# Patient Record
Sex: FEMALE | Race: OTHER | HISPANIC OR LATINO
[De-identification: names, ages, dates, MRNs, and addresses within clinical notes are randomized per-mention and may not be internally consistent; named-entity substitution may affect disease eponyms.]

---

## 2018-06-26 ENCOUNTER — APPOINTMENT (OUTPATIENT)
Dept: DERMATOLOGY | Facility: CLINIC | Age: 48
End: 2018-06-26

## 2018-07-31 ENCOUNTER — APPOINTMENT (OUTPATIENT)
Dept: DERMATOLOGY | Facility: CLINIC | Age: 48
End: 2018-07-31

## 2018-10-02 ENCOUNTER — RESULT REVIEW (OUTPATIENT)
Age: 48
End: 2018-10-02

## 2018-10-18 ENCOUNTER — APPOINTMENT (OUTPATIENT)
Dept: DERMATOLOGY | Facility: CLINIC | Age: 48
End: 2018-10-18

## 2018-11-21 ENCOUNTER — APPOINTMENT (OUTPATIENT)
Dept: DERMATOLOGY | Facility: CLINIC | Age: 48
End: 2018-11-21

## 2018-12-27 ENCOUNTER — APPOINTMENT (OUTPATIENT)
Dept: DERMATOLOGY | Facility: CLINIC | Age: 48
End: 2018-12-27
Payer: COMMERCIAL

## 2018-12-27 VITALS
WEIGHT: 160 LBS | HEIGHT: 65 IN | SYSTOLIC BLOOD PRESSURE: 100 MMHG | DIASTOLIC BLOOD PRESSURE: 74 MMHG | BODY MASS INDEX: 26.66 KG/M2

## 2018-12-27 DIAGNOSIS — L74.510 PRIMARY FOCAL HYPERHIDROSIS, AXILLA: ICD-10-CM

## 2018-12-27 DIAGNOSIS — Z91.89 OTHER SPECIFIED PERSONAL RISK FACTORS, NOT ELSEWHERE CLASSIFIED: ICD-10-CM

## 2018-12-27 DIAGNOSIS — Z87.39 PERSONAL HISTORY OF OTHER DISEASES OF THE MUSCULOSKELETAL SYSTEM AND CONNECTIVE TISSUE: ICD-10-CM

## 2018-12-27 DIAGNOSIS — L81.1 CHLOASMA: ICD-10-CM

## 2018-12-27 DIAGNOSIS — D23.9 OTHER BENIGN NEOPLASM OF SKIN, UNSPECIFIED: ICD-10-CM

## 2018-12-27 PROCEDURE — 99202 OFFICE O/P NEW SF 15 MIN: CPT

## 2018-12-27 RX ORDER — TRETINOIN 0.25 MG/G
0.03 CREAM TOPICAL
Qty: 1 | Refills: 3 | Status: ACTIVE | COMMUNITY
Start: 2018-12-27 | End: 1900-01-01

## 2018-12-27 RX ORDER — FLUOCINOLONE ACETONIDE, HYDROQUINONE, AND TRETINOIN .1; 40; .5 MG/G; MG/G; MG/G
0.01-4-0.05 CREAM TOPICAL
Qty: 1 | Refills: 3 | Status: COMPLETED | COMMUNITY
Start: 2018-12-27 | End: 2018-12-27

## 2018-12-27 RX ORDER — ALUMINUM CHLORIDE 20 %
20 SOLUTION, NON-ORAL TOPICAL
Qty: 1 | Refills: 5 | Status: ACTIVE | COMMUNITY
Start: 2018-12-27 | End: 1900-01-01

## 2019-09-23 ENCOUNTER — APPOINTMENT (OUTPATIENT)
Dept: SURGICAL ONCOLOGY | Facility: CLINIC | Age: 49
End: 2019-09-23
Payer: COMMERCIAL

## 2019-09-23 VITALS
BODY MASS INDEX: 26.03 KG/M2 | DIASTOLIC BLOOD PRESSURE: 72 MMHG | WEIGHT: 162 LBS | SYSTOLIC BLOOD PRESSURE: 120 MMHG | HEIGHT: 66 IN | HEART RATE: 70 BPM | RESPIRATION RATE: 18 BRPM

## 2019-09-23 DIAGNOSIS — M25.50 PAIN IN UNSPECIFIED JOINT: ICD-10-CM

## 2019-09-23 DIAGNOSIS — N64.59 OTHER SIGNS AND SYMPTOMS IN BREAST: ICD-10-CM

## 2019-09-23 DIAGNOSIS — Z86.59 PERSONAL HISTORY OF OTHER MENTAL AND BEHAVIORAL DISORDERS: ICD-10-CM

## 2019-09-23 PROCEDURE — 99203 OFFICE O/P NEW LOW 30 MIN: CPT

## 2019-09-23 NOTE — ASSESSMENT
[FreeTextEntry1] : IMP:\par Palpable left breast mass on patient's self breast exam.  Cyclic increase in size with patients menstrual cycle but no pain or tenderness.  No corresponding findings on breast imaging- breast imaging benign overall.  No discrete mass or worrisome findings on exam.\par \par PLAN:\par Return to office in 6 months for a repeat exam.  Continue annual breast imaging.

## 2019-09-23 NOTE — CONSULT LETTER
[Dear  ___] : Dear  [unfilled], [Consult Letter:] : I had the pleasure of evaluating your patient, [unfilled]. [Consult Closing:] : Thank you very much for allowing me to participate in the care of this patient.  If you have any questions, please do not hesitate to contact me. [Sincerely,] : Sincerely, [FreeTextEntry2] : Ephraim White MD

## 2019-09-23 NOTE — OB HISTORY
[Menarche Age ____] : menarche age [unfilled] [Definite ___ (Date)] : the last menstrual period was [unfilled] [Total Preg ___] : G[unfilled] [Live Births ___] : P[unfilled]  [FreeTextEntry2] : age 26 at first pregnancy

## 2019-09-23 NOTE — PHYSICAL EXAM
[Normal] : supple, no neck mass and thyroid not enlarged [Normal Supraclavicular Lymph Nodes] : normal supraclavicular lymph nodes [Normal Axillary Lymph Nodes] : normal axillary lymph nodes [Normal] : grossly intact [FreeTextEntry1] : AB present during exam  [de-identified] : Normal S1, S2.  Regular rate and rhythm.  [de-identified] : Clear breath sounds bilaterally, normal respiratory effort.  [de-identified] : Complete breast exam performed in supine and upright positions.  No palpable masses, tenderness, nipple discharge, inversion, deviation or enlarged axillary or supraclavicular lymph nodes bilaterally.  Left upper outer breast quadrant near axillary tail there is vague nodularity.

## 2019-09-23 NOTE — HISTORY OF PRESENT ILLNESS
[de-identified] : 48 year-old female presents for an initial consultation.  A few months ago she noticed a palpable mass in her left breast.  It has increased in size over time and enlarges cyclically with her menstrual cycle.  There is no associated pain, tenderness or nipple discharge.  She was referred for bilateral breast imaging in August 2019.  There are bilateral cysts but no evidence of malignancy and no mass or cyst in the region of palpable concern (BIRADS 2).  \par \par She has no prior issues related to her breasts.  She has a family history of breast cancer in a maternal aunt diagnosed at approximately 50.  She has a past medical history of SLE for which she takes Plaquenil and currently Prednisone 5 mg BID.

## 2020-03-24 ENCOUNTER — APPOINTMENT (OUTPATIENT)
Dept: SURGICAL ONCOLOGY | Facility: CLINIC | Age: 50
End: 2020-03-24

## 2020-08-05 ENCOUNTER — APPOINTMENT (OUTPATIENT)
Dept: SURGICAL ONCOLOGY | Facility: CLINIC | Age: 50
End: 2020-08-05

## 2020-10-06 ENCOUNTER — APPOINTMENT (OUTPATIENT)
Dept: SURGICAL ONCOLOGY | Facility: CLINIC | Age: 50
End: 2020-10-06
Payer: COMMERCIAL

## 2020-10-06 VITALS
HEART RATE: 80 BPM | DIASTOLIC BLOOD PRESSURE: 81 MMHG | OXYGEN SATURATION: 100 % | WEIGHT: 160 LBS | BODY MASS INDEX: 25.71 KG/M2 | TEMPERATURE: 98.1 F | HEIGHT: 66 IN | RESPIRATION RATE: 16 BRPM | SYSTOLIC BLOOD PRESSURE: 124 MMHG

## 2020-10-06 DIAGNOSIS — R92.2 INCONCLUSIVE MAMMOGRAM: ICD-10-CM

## 2020-10-06 PROCEDURE — 99213 OFFICE O/P EST LOW 20 MIN: CPT

## 2020-10-06 NOTE — PHYSICAL EXAM
[Normal] : supple, no neck mass and thyroid not enlarged [Normal Supraclavicular Lymph Nodes] : normal supraclavicular lymph nodes [Normal Axillary Lymph Nodes] : normal axillary lymph nodes [Normal] : oriented to person, place and time, with appropriate affect [FreeTextEntry1] : DT was present during the exam [de-identified] : Normal S1, S2.  Regular rate and rhythm.  [de-identified] : Complete breast exam performed in supine and upright positions.  No palpable masses, tenderness, nipple discharge, inversion, deviation or enlarged axillary or supraclavicular lymph nodes bilaterally.  Left upper outer breast quadrant near axillary tail there is vague nodularity.  [de-identified] : Clear breath sounds bilaterally, normal respiratory effort.

## 2020-10-06 NOTE — ADDENDUM
[FreeTextEntry1] : I, Gely Malone, acted soley as a scribe for Dr. Rodney Ramos on this date 10/06/2020.

## 2020-10-06 NOTE — HISTORY OF PRESENT ILLNESS
[de-identified] : 49 year-old female presents for a follow up breast exam. \par \par Pertinent Hx:\par  A few months ago she noticed a palpable mass in her left breast.  It has increased in size over time and enlarges cyclically with her menstrual cycle.  There is no associated pain, tenderness or nipple discharge.  She was referred for bilateral breast imaging in August 2019.  There are bilateral cysts but no evidence of malignancy and no mass or cyst in the region of palpable concern (BIRADS 2).  \par \par She has no prior issues related to her breasts.  She has a family history of breast cancer in a maternal aunt diagnosed at approximately 50.  She has a past medical history of SLE for which she takes Plaquenil and currently Prednisone 5 mg BID.  \par \par Today the pt was w/o any complaints. Denies palpable breast masses, nipple discharge, skin changes, inversion of breast pain. Denies constitutional symptoms\par

## 2021-02-24 ENCOUNTER — RESULT REVIEW (OUTPATIENT)
Age: 51
End: 2021-02-24

## 2021-05-25 ENCOUNTER — RESULT REVIEW (OUTPATIENT)
Age: 51
End: 2021-05-25

## 2023-12-20 ENCOUNTER — EMERGENCY (EMERGENCY)
Facility: HOSPITAL | Age: 53
LOS: 1 days | Discharge: HOME | End: 2023-12-20
Admitting: EMERGENCY MEDICINE
Payer: COMMERCIAL

## 2023-12-20 VITALS
DIASTOLIC BLOOD PRESSURE: 75 MMHG | RESPIRATION RATE: 16 BRPM | SYSTOLIC BLOOD PRESSURE: 126 MMHG | HEART RATE: 100 BPM | TEMPERATURE: 99 F | OXYGEN SATURATION: 100 %

## 2023-12-20 PROCEDURE — 99284 EMERGENCY DEPT VISIT MOD MDM: CPT

## 2023-12-20 NOTE — ED ADULT TRIAGE NOTE - CHIEF COMPLAINT QUOTE
Pt c/o bilateral lower back pain radiating to bilateral flanks, intermittent chill x 3 days. pt states she has urinary frequency but denies other urinary symptoms. No complaints of chest pain, headache, nausea, dizziness, vomiting  SOB, fever, verbalized..

## 2023-12-21 LAB
ALBUMIN SERPL ELPH-MCNC: 3.6 G/DL — SIGNIFICANT CHANGE UP (ref 3.3–5)
ALBUMIN SERPL ELPH-MCNC: 3.6 G/DL — SIGNIFICANT CHANGE UP (ref 3.3–5)
ALP SERPL-CCNC: 106 U/L — SIGNIFICANT CHANGE UP (ref 40–120)
ALP SERPL-CCNC: 106 U/L — SIGNIFICANT CHANGE UP (ref 40–120)
ALT FLD-CCNC: 29 U/L — SIGNIFICANT CHANGE UP (ref 4–33)
ALT FLD-CCNC: 29 U/L — SIGNIFICANT CHANGE UP (ref 4–33)
ANION GAP SERPL CALC-SCNC: 11 MMOL/L — SIGNIFICANT CHANGE UP (ref 7–14)
ANION GAP SERPL CALC-SCNC: 11 MMOL/L — SIGNIFICANT CHANGE UP (ref 7–14)
ANISOCYTOSIS BLD QL: SLIGHT — SIGNIFICANT CHANGE UP
ANISOCYTOSIS BLD QL: SLIGHT — SIGNIFICANT CHANGE UP
APPEARANCE UR: ABNORMAL
APPEARANCE UR: ABNORMAL
AST SERPL-CCNC: 27 U/L — SIGNIFICANT CHANGE UP (ref 4–32)
AST SERPL-CCNC: 27 U/L — SIGNIFICANT CHANGE UP (ref 4–32)
BACTERIA # UR AUTO: ABNORMAL /HPF
BACTERIA # UR AUTO: ABNORMAL /HPF
BASOPHILS # BLD AUTO: 0 K/UL — SIGNIFICANT CHANGE UP (ref 0–0.2)
BASOPHILS # BLD AUTO: 0 K/UL — SIGNIFICANT CHANGE UP (ref 0–0.2)
BASOPHILS NFR BLD AUTO: 0 % — SIGNIFICANT CHANGE UP (ref 0–2)
BASOPHILS NFR BLD AUTO: 0 % — SIGNIFICANT CHANGE UP (ref 0–2)
BILIRUB SERPL-MCNC: 0.4 MG/DL — SIGNIFICANT CHANGE UP (ref 0.2–1.2)
BILIRUB SERPL-MCNC: 0.4 MG/DL — SIGNIFICANT CHANGE UP (ref 0.2–1.2)
BILIRUB UR-MCNC: NEGATIVE — SIGNIFICANT CHANGE UP
BILIRUB UR-MCNC: NEGATIVE — SIGNIFICANT CHANGE UP
BUN SERPL-MCNC: 16 MG/DL — SIGNIFICANT CHANGE UP (ref 7–23)
BUN SERPL-MCNC: 16 MG/DL — SIGNIFICANT CHANGE UP (ref 7–23)
CALCIUM SERPL-MCNC: 8.7 MG/DL — SIGNIFICANT CHANGE UP (ref 8.4–10.5)
CALCIUM SERPL-MCNC: 8.7 MG/DL — SIGNIFICANT CHANGE UP (ref 8.4–10.5)
CAST: 2 /LPF — SIGNIFICANT CHANGE UP (ref 0–4)
CAST: 2 /LPF — SIGNIFICANT CHANGE UP (ref 0–4)
CHLORIDE SERPL-SCNC: 105 MMOL/L — SIGNIFICANT CHANGE UP (ref 98–107)
CHLORIDE SERPL-SCNC: 105 MMOL/L — SIGNIFICANT CHANGE UP (ref 98–107)
CO2 SERPL-SCNC: 21 MMOL/L — LOW (ref 22–31)
CO2 SERPL-SCNC: 21 MMOL/L — LOW (ref 22–31)
COLOR SPEC: YELLOW — SIGNIFICANT CHANGE UP
COLOR SPEC: YELLOW — SIGNIFICANT CHANGE UP
CREAT SERPL-MCNC: 0.86 MG/DL — SIGNIFICANT CHANGE UP (ref 0.5–1.3)
CREAT SERPL-MCNC: 0.86 MG/DL — SIGNIFICANT CHANGE UP (ref 0.5–1.3)
DACRYOCYTES BLD QL SMEAR: SLIGHT — SIGNIFICANT CHANGE UP
DACRYOCYTES BLD QL SMEAR: SLIGHT — SIGNIFICANT CHANGE UP
DIFF PNL FLD: ABNORMAL
DIFF PNL FLD: ABNORMAL
EGFR: 82 ML/MIN/1.73M2 — SIGNIFICANT CHANGE UP
EGFR: 82 ML/MIN/1.73M2 — SIGNIFICANT CHANGE UP
EOSINOPHIL # BLD AUTO: 0.03 K/UL — SIGNIFICANT CHANGE UP (ref 0–0.5)
EOSINOPHIL # BLD AUTO: 0.03 K/UL — SIGNIFICANT CHANGE UP (ref 0–0.5)
EOSINOPHIL NFR BLD AUTO: 0.9 % — SIGNIFICANT CHANGE UP (ref 0–6)
EOSINOPHIL NFR BLD AUTO: 0.9 % — SIGNIFICANT CHANGE UP (ref 0–6)
FLUAV AG NPH QL: SIGNIFICANT CHANGE UP
FLUAV AG NPH QL: SIGNIFICANT CHANGE UP
FLUBV AG NPH QL: SIGNIFICANT CHANGE UP
FLUBV AG NPH QL: SIGNIFICANT CHANGE UP
GLUCOSE SERPL-MCNC: 124 MG/DL — HIGH (ref 70–99)
GLUCOSE SERPL-MCNC: 124 MG/DL — HIGH (ref 70–99)
GLUCOSE UR QL: NEGATIVE MG/DL — SIGNIFICANT CHANGE UP
GLUCOSE UR QL: NEGATIVE MG/DL — SIGNIFICANT CHANGE UP
HCT VFR BLD CALC: 32.8 % — LOW (ref 34.5–45)
HCT VFR BLD CALC: 32.8 % — LOW (ref 34.5–45)
HGB BLD-MCNC: 11 G/DL — LOW (ref 11.5–15.5)
HGB BLD-MCNC: 11 G/DL — LOW (ref 11.5–15.5)
IANC: 2.34 K/UL — SIGNIFICANT CHANGE UP (ref 1.8–7.4)
IANC: 2.34 K/UL — SIGNIFICANT CHANGE UP (ref 1.8–7.4)
KETONES UR-MCNC: NEGATIVE MG/DL — SIGNIFICANT CHANGE UP
KETONES UR-MCNC: NEGATIVE MG/DL — SIGNIFICANT CHANGE UP
LEUKOCYTE ESTERASE UR-ACNC: ABNORMAL
LEUKOCYTE ESTERASE UR-ACNC: ABNORMAL
LYMPHOCYTES # BLD AUTO: 0.11 K/UL — LOW (ref 1–3.3)
LYMPHOCYTES # BLD AUTO: 0.11 K/UL — LOW (ref 1–3.3)
LYMPHOCYTES # BLD AUTO: 3.6 % — LOW (ref 13–44)
LYMPHOCYTES # BLD AUTO: 3.6 % — LOW (ref 13–44)
MACROCYTES BLD QL: SIGNIFICANT CHANGE UP
MACROCYTES BLD QL: SIGNIFICANT CHANGE UP
MANUAL SMEAR VERIFICATION: SIGNIFICANT CHANGE UP
MANUAL SMEAR VERIFICATION: SIGNIFICANT CHANGE UP
MCHC RBC-ENTMCNC: 33.5 GM/DL — SIGNIFICANT CHANGE UP (ref 32–36)
MCHC RBC-ENTMCNC: 33.5 GM/DL — SIGNIFICANT CHANGE UP (ref 32–36)
MCHC RBC-ENTMCNC: 36.5 PG — HIGH (ref 27–34)
MCHC RBC-ENTMCNC: 36.5 PG — HIGH (ref 27–34)
MCV RBC AUTO: 109 FL — HIGH (ref 80–100)
MCV RBC AUTO: 109 FL — HIGH (ref 80–100)
MONOCYTES # BLD AUTO: 0.17 K/UL — SIGNIFICANT CHANGE UP (ref 0–0.9)
MONOCYTES # BLD AUTO: 0.17 K/UL — SIGNIFICANT CHANGE UP (ref 0–0.9)
MONOCYTES NFR BLD AUTO: 5.5 % — SIGNIFICANT CHANGE UP (ref 2–14)
MONOCYTES NFR BLD AUTO: 5.5 % — SIGNIFICANT CHANGE UP (ref 2–14)
NEUTROPHILS # BLD AUTO: 2.81 K/UL — SIGNIFICANT CHANGE UP (ref 1.8–7.4)
NEUTROPHILS # BLD AUTO: 2.81 K/UL — SIGNIFICANT CHANGE UP (ref 1.8–7.4)
NEUTROPHILS NFR BLD AUTO: 87.3 % — HIGH (ref 43–77)
NEUTROPHILS NFR BLD AUTO: 87.3 % — HIGH (ref 43–77)
NEUTS BAND # BLD: 1.8 % — SIGNIFICANT CHANGE UP (ref 0–6)
NEUTS BAND # BLD: 1.8 % — SIGNIFICANT CHANGE UP (ref 0–6)
NITRITE UR-MCNC: POSITIVE
NITRITE UR-MCNC: POSITIVE
OVALOCYTES BLD QL SMEAR: SLIGHT — SIGNIFICANT CHANGE UP
OVALOCYTES BLD QL SMEAR: SLIGHT — SIGNIFICANT CHANGE UP
PH UR: 6 — SIGNIFICANT CHANGE UP (ref 5–8)
PH UR: 6 — SIGNIFICANT CHANGE UP (ref 5–8)
PLAT MORPH BLD: NORMAL — SIGNIFICANT CHANGE UP
PLAT MORPH BLD: NORMAL — SIGNIFICANT CHANGE UP
PLATELET # BLD AUTO: 284 K/UL — SIGNIFICANT CHANGE UP (ref 150–400)
PLATELET # BLD AUTO: 284 K/UL — SIGNIFICANT CHANGE UP (ref 150–400)
PLATELET COUNT - ESTIMATE: NORMAL — SIGNIFICANT CHANGE UP
PLATELET COUNT - ESTIMATE: NORMAL — SIGNIFICANT CHANGE UP
POIKILOCYTOSIS BLD QL AUTO: SLIGHT — SIGNIFICANT CHANGE UP
POIKILOCYTOSIS BLD QL AUTO: SLIGHT — SIGNIFICANT CHANGE UP
POLYCHROMASIA BLD QL SMEAR: SLIGHT — SIGNIFICANT CHANGE UP
POLYCHROMASIA BLD QL SMEAR: SLIGHT — SIGNIFICANT CHANGE UP
POTASSIUM SERPL-MCNC: 3.9 MMOL/L — SIGNIFICANT CHANGE UP (ref 3.5–5.3)
POTASSIUM SERPL-MCNC: 3.9 MMOL/L — SIGNIFICANT CHANGE UP (ref 3.5–5.3)
POTASSIUM SERPL-SCNC: 3.9 MMOL/L — SIGNIFICANT CHANGE UP (ref 3.5–5.3)
POTASSIUM SERPL-SCNC: 3.9 MMOL/L — SIGNIFICANT CHANGE UP (ref 3.5–5.3)
PROT SERPL-MCNC: 6.2 G/DL — SIGNIFICANT CHANGE UP (ref 6–8.3)
PROT SERPL-MCNC: 6.2 G/DL — SIGNIFICANT CHANGE UP (ref 6–8.3)
PROT UR-MCNC: SIGNIFICANT CHANGE UP MG/DL
PROT UR-MCNC: SIGNIFICANT CHANGE UP MG/DL
RBC # BLD: 3.01 M/UL — LOW (ref 3.8–5.2)
RBC # BLD: 3.01 M/UL — LOW (ref 3.8–5.2)
RBC # FLD: 14.5 % — SIGNIFICANT CHANGE UP (ref 10.3–14.5)
RBC # FLD: 14.5 % — SIGNIFICANT CHANGE UP (ref 10.3–14.5)
RBC BLD AUTO: ABNORMAL
RBC BLD AUTO: ABNORMAL
RBC CASTS # UR COMP ASSIST: 1 /HPF — SIGNIFICANT CHANGE UP (ref 0–4)
RBC CASTS # UR COMP ASSIST: 1 /HPF — SIGNIFICANT CHANGE UP (ref 0–4)
RSV RNA NPH QL NAA+NON-PROBE: SIGNIFICANT CHANGE UP
RSV RNA NPH QL NAA+NON-PROBE: SIGNIFICANT CHANGE UP
SARS-COV-2 RNA SPEC QL NAA+PROBE: SIGNIFICANT CHANGE UP
SARS-COV-2 RNA SPEC QL NAA+PROBE: SIGNIFICANT CHANGE UP
SODIUM SERPL-SCNC: 137 MMOL/L — SIGNIFICANT CHANGE UP (ref 135–145)
SODIUM SERPL-SCNC: 137 MMOL/L — SIGNIFICANT CHANGE UP (ref 135–145)
SP GR SPEC: 1.02 — SIGNIFICANT CHANGE UP (ref 1–1.03)
SP GR SPEC: 1.02 — SIGNIFICANT CHANGE UP (ref 1–1.03)
SQUAMOUS # UR AUTO: 2 /HPF — SIGNIFICANT CHANGE UP (ref 0–5)
SQUAMOUS # UR AUTO: 2 /HPF — SIGNIFICANT CHANGE UP (ref 0–5)
UROBILINOGEN FLD QL: 1 MG/DL — SIGNIFICANT CHANGE UP (ref 0.2–1)
UROBILINOGEN FLD QL: 1 MG/DL — SIGNIFICANT CHANGE UP (ref 0.2–1)
VARIANT LYMPHS # BLD: 0.9 % — SIGNIFICANT CHANGE UP (ref 0–6)
VARIANT LYMPHS # BLD: 0.9 % — SIGNIFICANT CHANGE UP (ref 0–6)
WBC # BLD: 3.15 K/UL — LOW (ref 3.8–10.5)
WBC # BLD: 3.15 K/UL — LOW (ref 3.8–10.5)
WBC # FLD AUTO: 3.15 K/UL — LOW (ref 3.8–10.5)
WBC # FLD AUTO: 3.15 K/UL — LOW (ref 3.8–10.5)
WBC UR QL: 136 /HPF — HIGH (ref 0–5)
WBC UR QL: 136 /HPF — HIGH (ref 0–5)

## 2023-12-21 PROCEDURE — 76770 US EXAM ABDO BACK WALL COMP: CPT | Mod: 26

## 2023-12-21 RX ADMIN — Medication 1 TABLET(S): at 02:04

## 2023-12-21 NOTE — ED PROVIDER NOTE - MUSCULOSKELETAL, MLM
Tenderness to the left flank Spine appears normal, range of motion is not limited, no muscle or joint tenderness

## 2023-12-21 NOTE — ED PROVIDER NOTE - CLINICAL SUMMARY MEDICAL DECISION MAKING FREE TEXT BOX
Patient is a 52-year-old female, past medical history of lupus presenting with a complaint of bilateral flank pain radiating to the back associated with urinary frequency, chills x times few days. On presentation patient nontoxic-appearing, vital stable symptoms possibly due to UTI/pyelonephritis/viral syndrome/MSK pain.  Plan for routine labs, UA/urine culture, viral swab, ultrasound of kidney/bladder.

## 2023-12-21 NOTE — ED ADULT NURSE NOTE - OBJECTIVE STATEMENT
Received pt in intake. Pt is A&O x4 past medical history of lupus. Pt came to the ED due to B/L flank pain. Pt states she also has increased urinary frequency with chills for the past few days. Pt breathing is equal and nonlabored. Pt abdomen is soft and nondistended. Pt denies any chest pain, SOB, headache, or any sick contacts. Pt butterflied as MD ordered and labs collected and sent. Pt safety maintained.

## 2023-12-21 NOTE — ED PROVIDER NOTE - BIRTH SEX
Physician Pre-Sedation Assessment    Pre-Sedation Assessment:    Sedation History: Previous Sedation with No Complications and Airway Assessed    Cardiac: normal S1, S2  Respiratory: breath sounds clear bilaterally   Abdomen: soft, BS (+), non-tender    AS
Female

## 2023-12-21 NOTE — ED PROVIDER NOTE - OBJECTIVE STATEMENT
Patient is a 52-year-old female, past medical history of lupus presenting with a complaint of bilateral flank pain radiating to the back associated with urinary frequency, chills x times few days.  No associated nasal congestion, cough or other URI-like symptoms, dysuria, hematuria.  Patient additionally denies remote history of back pain, recent trauma, strenuous activity, recent travel, sick contacts prior to the onset of symptoms.

## 2023-12-21 NOTE — ED ADULT NURSE NOTE - NSFALLUNIVINTERV_ED_ALL_ED
Bed/Stretcher in lowest position, wheels locked, appropriate side rails in place/Call bell, personal items and telephone in reach/Instruct patient to call for assistance before getting out of bed/chair/stretcher/Non-slip footwear applied when patient is off stretcher/[place] to call system/Physically safe environment - no spills, clutter or unnecessary equipment/Purposeful proactive rounding/Room/bathroom lighting operational, light cord in reach Bed/Stretcher in lowest position, wheels locked, appropriate side rails in place/Call bell, personal items and telephone in reach/Instruct patient to call for assistance before getting out of bed/chair/stretcher/Non-slip footwear applied when patient is off stretcher/[place] to call system/Physically safe environment - no spills, clutter or unnecessary equipment/Purposeful proactive rounding/Room/bathroom lighting operational, light cord in reach

## 2023-12-21 NOTE — ED PROVIDER NOTE - PATIENT PORTAL LINK FT
You can access the FollowMyHealth Patient Portal offered by [facility] by registering at the following website: [email or web address]. By joining [employer]’s FollowMyHealth portal, you will also be able to view your health information using other applications (apps) compatible with our system. You can access the FollowMyHealth Patient Portal offered by [facility] by registering at the following website: [email or web address]. By joining [employer]’s FollowMyHealth portal, you will also be able to view your health information using other applications (apps) compatible with our system.

## 2023-12-23 LAB
-  AMOXICILLIN/CLAVULANIC ACID: SIGNIFICANT CHANGE UP
-  AMOXICILLIN/CLAVULANIC ACID: SIGNIFICANT CHANGE UP
-  AMPICILLIN/SULBACTAM: SIGNIFICANT CHANGE UP
-  AMPICILLIN/SULBACTAM: SIGNIFICANT CHANGE UP
-  AMPICILLIN: SIGNIFICANT CHANGE UP
-  AMPICILLIN: SIGNIFICANT CHANGE UP
-  AZTREONAM: SIGNIFICANT CHANGE UP
-  AZTREONAM: SIGNIFICANT CHANGE UP
-  CEFAZOLIN: SIGNIFICANT CHANGE UP
-  CEFAZOLIN: SIGNIFICANT CHANGE UP
-  CEFEPIME: SIGNIFICANT CHANGE UP
-  CEFEPIME: SIGNIFICANT CHANGE UP
-  CEFTRIAXONE: SIGNIFICANT CHANGE UP
-  CEFTRIAXONE: SIGNIFICANT CHANGE UP
-  CEFUROXIME: SIGNIFICANT CHANGE UP
-  CEFUROXIME: SIGNIFICANT CHANGE UP
-  CIPROFLOXACIN: SIGNIFICANT CHANGE UP
-  CIPROFLOXACIN: SIGNIFICANT CHANGE UP
-  ERTAPENEM: SIGNIFICANT CHANGE UP
-  ERTAPENEM: SIGNIFICANT CHANGE UP
-  GENTAMICIN: SIGNIFICANT CHANGE UP
-  GENTAMICIN: SIGNIFICANT CHANGE UP
-  IMIPENEM: SIGNIFICANT CHANGE UP
-  IMIPENEM: SIGNIFICANT CHANGE UP
-  LEVOFLOXACIN: SIGNIFICANT CHANGE UP
-  LEVOFLOXACIN: SIGNIFICANT CHANGE UP
-  MEROPENEM: SIGNIFICANT CHANGE UP
-  MEROPENEM: SIGNIFICANT CHANGE UP
-  NITROFURANTOIN: SIGNIFICANT CHANGE UP
-  NITROFURANTOIN: SIGNIFICANT CHANGE UP
-  PIPERACILLIN/TAZOBACTAM: SIGNIFICANT CHANGE UP
-  PIPERACILLIN/TAZOBACTAM: SIGNIFICANT CHANGE UP
-  TOBRAMYCIN: SIGNIFICANT CHANGE UP
-  TOBRAMYCIN: SIGNIFICANT CHANGE UP
-  TRIMETHOPRIM/SULFAMETHOXAZOLE: SIGNIFICANT CHANGE UP
-  TRIMETHOPRIM/SULFAMETHOXAZOLE: SIGNIFICANT CHANGE UP
CULTURE RESULTS: ABNORMAL
CULTURE RESULTS: ABNORMAL
METHOD TYPE: SIGNIFICANT CHANGE UP
METHOD TYPE: SIGNIFICANT CHANGE UP
ORGANISM # SPEC MICROSCOPIC CNT: ABNORMAL
SPECIMEN SOURCE: SIGNIFICANT CHANGE UP
SPECIMEN SOURCE: SIGNIFICANT CHANGE UP

## 2024-05-10 ENCOUNTER — APPOINTMENT (OUTPATIENT)
Dept: HEPATOLOGY | Facility: CLINIC | Age: 54
End: 2024-05-10
Payer: COMMERCIAL

## 2024-05-10 VITALS
DIASTOLIC BLOOD PRESSURE: 72 MMHG | WEIGHT: 168 LBS | TEMPERATURE: 96.3 F | HEIGHT: 66 IN | RESPIRATION RATE: 16 BRPM | HEART RATE: 77 BPM | BODY MASS INDEX: 27 KG/M2 | OXYGEN SATURATION: 96 % | SYSTOLIC BLOOD PRESSURE: 102 MMHG

## 2024-05-10 PROCEDURE — 99204 OFFICE O/P NEW MOD 45 MIN: CPT

## 2024-05-11 NOTE — PHYSICAL EXAM
[Scleral Icterus] : No Scleral Icterus [Depression] : no depression [General Appearance - Alert] : alert [General Appearance - In No Acute Distress] : in no acute distress [Sclera] : the sclera and conjunctiva were normal [Neck Appearance] : the appearance of the neck was normal [Neck Cervical Mass (___cm)] : no neck mass was observed [Apical Impulse] : the apical impulse was normal [Heart Sounds] : normal S1 and S2 [Edema] : there was no peripheral edema [Veins - Varicosity Changes] : there were no varicosital changes [Abdomen Soft] : soft [Bowel Sounds] : normal bowel sounds [Abdomen Mass (___ Cm)] : no abdominal mass palpated [Abdomen Hernia] : no hernia was discovered [Supraclavicular Lymph Nodes Enlarged Bilaterally] : supraclavicular [Cervical Lymph Nodes Enlarged Posterior Bilaterally] : posterior cervical [No CVA Tenderness] : no ~M costovertebral angle tenderness [Abnormal Walk] : normal gait [Nail Clubbing] : no clubbing  or cyanosis of the fingernails [Involuntary Movements] : no involuntary movements were seen [] : no rash [No Focal Deficits] : no focal deficits [Oriented To Time, Place, And Person] : oriented to person, place, and time

## 2024-05-11 NOTE — HISTORY OF PRESENT ILLNESS
[Tattoo] : tattoo(s) [Body Piercing] : body piercing [Wt Gain ___ Lbs] : recent [unfilled] ~Upound(s) weight gain [___] : Performed [unfilled] [Needlestick Exposure] : no needlestick exposure [Infected Sexual Partner] : no infected sexual partner [IV Drug Use] : no IV drug use [Hemodialysis] : no hemodialysis [Transfusion before 1992] : no transfusion before 1992 [Transplant before 1992] : no transplant before 1992 [Incarceration] : no incarceration [Alcohol Abuse] : no alcohol abuse [Autoimmune Disorder] : no autoimmune disorder [Household Contact to HBV] : no household contact to HBV [Travel to Endemic Area] : no travel to an endemic area [Occupational Exposure] : no occupational exposure [Cocaine Use] : no cocaine use [de-identified] : Ms. TACO LAINEZ is 53-year-old female who presents for the initial evaluation with Liver enzymes elevations and fatty Liver since 2024 annual visit with PCP. She feels well. Denies c/o abdominal pain, pruritis, melena, GIB. Recent 7 lbs pound(s) weight gain with in 6 months while going through divorce attributes to stress eating.  MH/o SLE since 33 yoa on Plaquenil 200mg BID, Azathioprine 50 mg TID and Prednisone 5 mg as needed for flares. Denies MH/O Liver diseases, clinical hepatitis, Jaundice.  Denies Social H/O alcohol use or smoking/marijuana use. Reports no high-risk occupation/behavior. She works as a teacher Assistance. 2 C-sections.  Colonoscopy: Dr. Richy Means GI @ [facility], Performed 2023. (No reports to review) COL/EGD - Denies any FH/O Liver disease or GI cancers. Immunity - COVID IMM completed with 2 dose reports no exposures. Reviewed HIE labs from 12/2023: Normal liver enzymes with low WBC, WDL- PLT and H/H.  Additional Providers: PCP Dr. ROBBIN HOYOS [de-identified] : eye tattoos [de-identified] : Dr. Richy CARLSON @ [facility]

## 2024-05-11 NOTE — ASSESSMENT
[FreeTextEntry1] : Ms. TACO LAINEZ is 53-year-old female who presents for the initial evaluation with Liver enzymes elevations and fatty Liver since 2024 annual visit with PCP.   R/o ALD: She feels well. Denies c/o abdominal pain, pruritis, melena, GIB.  Denies MH/O Liver diseases, clinical hepatitis, Jaundice.  ? Elevated Liver enzymes: Reviewed HIE labs from 12/2023: Normal liver enzymes with low WBC, WDL- PLT and H/H.  # MASLD:  Overweight: BMI 27.1 weighing 168 lbs, Recent 7 lbs pound(s) weight gain with in 6 months while going through divorce attributes to tress eating.  MH/o SLE since 33 yoa on Plaquenil 200mg BID, Azathioprine 50 mg TID and Prednisone 5 mg as needed for flares. Denies Social H/O alcohol use or smoking/marijuana use. Reports no high-risk occupation/behavior. She works as a teacher Assistance. 2 C-sections.  # RHM: PCP Dr. ROBBIN HOYOS COL/EGD - Denies any FH/O Liver disease or GI cancers. Dr. Richy CARLSON @ [facility], Performed 2023. (No reports to review) + Immunity - COVID IMM completed with 2 dose reports no exposures.  PLAN to F/U in a month with fasting BW/US ab as ordered and FS with RPA. Encouraged to call back in the interim with any issues or concerns so that we can address and assist as required.

## 2024-05-13 LAB
25(OH)D3 SERPL-MCNC: 84.1 NG/ML
AFP-TM SERPL-MCNC: 1.9 NG/ML
ALBUMIN SERPL ELPH-MCNC: 4.3 G/DL
ALP BLD-CCNC: 110 U/L
ALT SERPL-CCNC: 26 U/L
ANION GAP SERPL CALC-SCNC: 12 MMOL/L
AST SERPL-CCNC: 24 U/L
BILIRUB DIRECT SERPL-MCNC: 0.2 MG/DL
BILIRUB SERPL-MCNC: 1 MG/DL
BUN SERPL-MCNC: 17 MG/DL
CALCIUM SERPL-MCNC: 9.5 MG/DL
CHLORIDE SERPL-SCNC: 106 MMOL/L
CO2 SERPL-SCNC: 20 MMOL/L
CREAT SERPL-MCNC: 0.75 MG/DL
DEPRECATED KAPPA LC FREE/LAMBDA SER: 1.04 RATIO
EGFR: 96 ML/MIN/1.73M2
ESTIMATED AVERAGE GLUCOSE: 111 MG/DL
GGT SERPL-CCNC: 12 U/L
HBA1C MFR BLD HPLC: 5.5 %
HBV CORE IGG+IGM SER QL: NONREACTIVE
HBV SURFACE AB SER QL: REACTIVE
HBV SURFACE AG SER QL: NONREACTIVE
HCT VFR BLD CALC: 38.7 %
HCV AB SER QL: NONREACTIVE
HCV S/CO RATIO: 0.08 S/CO
HEPATITIS A IGG ANTIBODY: REACTIVE
HGB BLD-MCNC: 13.1 G/DL
IGA SER QL IEP: 257 MG/DL
IGG SER QL IEP: 1209 MG/DL
IGM SER QL IEP: 25 MG/DL
INR PPP: 1.14 RATIO
KAPPA LC CSF-MCNC: 1.67 MG/DL
KAPPA LC SERPL-MCNC: 1.73 MG/DL
MCHC RBC-ENTMCNC: 33.9 GM/DL
MCHC RBC-ENTMCNC: 37 PG
MCV RBC AUTO: 109.3 FL
PLATELET # BLD AUTO: 221 K/UL
POTASSIUM SERPL-SCNC: 4 MMOL/L
PROT SERPL-MCNC: 6.7 G/DL
PT BLD: 12.8 SEC
RBC # BLD: 3.54 M/UL
RBC # FLD: 11.8 %
SODIUM SERPL-SCNC: 138 MMOL/L
WBC # FLD AUTO: 2.97 K/UL

## 2024-05-14 LAB
MITOCHONDRIA AB SER IF-ACNC: NORMAL
SMOOTH MUSCLE AB SER QL IF: NORMAL

## 2024-05-15 LAB
24R-OH-CALCIDIOL SERPL-MCNC: 77.7 PG/ML
ANA PAT FLD IF-IMP: ABNORMAL
ANA SER IF-ACNC: ABNORMAL
BILE AC SER-MCNC: 1.6 UMOL/L
LKM AB SER QL IF: <20.1 UNITS
ZINC SERPL-MCNC: 97 UG/DL

## 2024-05-16 LAB
AST SERPL W P-5'-P-CCNC: 25 IU/L
CHOLEST SERPL-MCNC: 168 MG/DL
COMMENT:: NORMAL
FIBROSIS STAGE SERPL QL: NORMAL
FIBROSURE ALPHA 2 MACROGLOBULINS: 148 MG/DL
FIBROSURE ALT (SGPT): 40 IU/L
FIBROSURE APOLIPOPROTEIN A1: 129 MG/DL
FIBROSURE GGT: 13 IU/L
FIBROSURE HAPTOGLOBIN: 85 MG/DL
FIBROSURE SCORING: NORMAL
FIBROSURE TOTAL BILIRUBIN: 0.8 MG/DL
GLUCOSE SERPL-MCNC: 86 MG/DL
INTERPRETATIONS:: NORMAL
LIVER FIBR SCORE SERPL CALC.FIBROSURE: 0.16
Lab: NORMAL
NASH SCORING: NORMAL
NECROINFLAMMATORY ACT GRADE SERPL QL: NORMAL
NECROINFLAMMATORY ACT SCORE SERPL: 0
SERVICE CMNT-IMP: NORMAL
STEATOSIS GRADE: NORMAL
STEATOSIS SCORE: 0.34
STEATOSIS SCORING: NORMAL
TRIGL SERPL-MCNC: 50 MG/DL

## 2024-05-20 LAB — VIT A SERPL-MCNC: 78.7 UG/DL

## 2024-05-22 DIAGNOSIS — M06.9 RHEUMATOID ARTHRITIS, UNSPECIFIED: ICD-10-CM

## 2024-05-22 RX ORDER — AZATHIOPRINE 50 1/1
50 TABLET ORAL 3 TIMES DAILY
Refills: 0 | Status: COMPLETED | COMMUNITY
Start: 2024-05-10 | End: 2024-05-10

## 2024-05-22 RX ORDER — PREDNISONE 5 MG/1
5 TABLET ORAL
Refills: 0 | Status: COMPLETED | COMMUNITY
End: 2024-05-10

## 2024-05-25 ENCOUNTER — APPOINTMENT (OUTPATIENT)
Dept: ULTRASOUND IMAGING | Facility: CLINIC | Age: 54
End: 2024-05-25
Payer: COMMERCIAL

## 2024-05-25 PROCEDURE — 76700 US EXAM ABDOM COMPLETE: CPT

## 2024-06-17 LAB
25(OH)D3 SERPL-MCNC: 57.7 NG/ML
ALBUMIN SERPL ELPH-MCNC: 4.5 G/DL
ALP BLD-CCNC: 130 U/L
ALT SERPL-CCNC: 22 U/L
ANION GAP SERPL CALC-SCNC: 10 MMOL/L
AST SERPL-CCNC: 22 U/L
BILIRUB SERPL-MCNC: 0.6 MG/DL
BUN SERPL-MCNC: 14 MG/DL
CALCIUM SERPL-MCNC: 9.6 MG/DL
CHLORIDE SERPL-SCNC: 107 MMOL/L
CO2 SERPL-SCNC: 24 MMOL/L
CREAT SERPL-MCNC: 0.93 MG/DL
EGFR: 74 ML/MIN/1.73M2
GGT SERPL-CCNC: 14 U/L
HCT VFR BLD CALC: 38 %
HGB BLD-MCNC: 13.2 G/DL
INR PPP: 0.91 RATIO
MCHC RBC-ENTMCNC: 34.7 GM/DL
MCHC RBC-ENTMCNC: 36.7 PG
MCV RBC AUTO: 105.6 FL
PLATELET # BLD AUTO: 243 K/UL
POTASSIUM SERPL-SCNC: 3.9 MMOL/L
PROT SERPL-MCNC: 7.3 G/DL
PT BLD: 10.3 SEC
RBC # BLD: 3.6 M/UL
RBC # FLD: 12.7 %
SODIUM SERPL-SCNC: 141 MMOL/L
WBC # FLD AUTO: 5.55 K/UL

## 2024-06-18 LAB
ANA PAT FLD IF-IMP: ABNORMAL
ANA SER IF-ACNC: ABNORMAL

## 2024-06-20 ENCOUNTER — APPOINTMENT (OUTPATIENT)
Dept: HEPATOLOGY | Facility: CLINIC | Age: 54
End: 2024-06-20
Payer: COMMERCIAL

## 2024-06-20 VITALS
OXYGEN SATURATION: 99 % | HEIGHT: 66 IN | HEART RATE: 74 BPM | TEMPERATURE: 97.3 F | BODY MASS INDEX: 27.48 KG/M2 | SYSTOLIC BLOOD PRESSURE: 128 MMHG | DIASTOLIC BLOOD PRESSURE: 79 MMHG | WEIGHT: 171 LBS

## 2024-06-20 DIAGNOSIS — K83.8 OTHER SPECIFIED DISEASES OF BILIARY TRACT: ICD-10-CM

## 2024-06-20 DIAGNOSIS — K76.0 FATTY (CHANGE OF) LIVER, NOT ELSEWHERE CLASSIFIED: ICD-10-CM

## 2024-06-20 DIAGNOSIS — E66.3 OVERWEIGHT: ICD-10-CM

## 2024-06-20 DIAGNOSIS — D73.89 OTHER DISEASES OF SPLEEN: ICD-10-CM

## 2024-06-20 DIAGNOSIS — M32.9 SYSTEMIC LUPUS ERYTHEMATOSUS, UNSPECIFIED: ICD-10-CM

## 2024-06-20 DIAGNOSIS — K21.9 GASTRO-ESOPHAGEAL REFLUX DISEASE W/OUT ESOPHAGITIS: ICD-10-CM

## 2024-06-20 LAB — VIT A SERPL-MCNC: 45.7 UG/DL

## 2024-06-20 PROCEDURE — 76981 USE PARENCHYMA: CPT

## 2024-06-20 PROCEDURE — 99215 OFFICE O/P EST HI 40 MIN: CPT

## 2024-06-21 PROBLEM — K76.0 FATTY INFILTRATION OF LIVER: Status: ACTIVE | Noted: 2024-05-10

## 2024-06-22 PROBLEM — D73.89 CALCIFICATION OF SPLEEN: Status: ACTIVE | Noted: 2024-06-22

## 2024-06-22 PROBLEM — K83.8 COMMON BILE DUCT DILATATION: Status: ACTIVE | Noted: 2024-06-22

## 2024-06-22 PROBLEM — E66.3 OVERWEIGHT: Status: ACTIVE | Noted: 2024-06-22

## 2024-06-22 NOTE — ASSESSMENT
[FreeTextEntry1] : Ms. TACO LAINEZ is 53-year-old female who presents for the follow up with ALP after stopping the OTC vitamins and Azathioprine.  # MRCP ordered, pending FA from insurance for US ab findings on 05/25/2024: + Common bile duct measures 7 mm (upper limit normal). Denies any pain meds. On Plaquenil for RA. + Spleen: Innumerable millimeter-sized calcified granulomas,   # Elevated Liver enzymes: Reviewed BW with isolated new ^ ALP + Hypervitaminosis: Resolved when off all Vitamins since NPA.  + Low WBC: Resolved when off Azathioprine since 05/10/2024.   # MASLD: S3 ( dB/m) scored in FS and Mild hepatic steatosis in US. + Overweight: BMI 27.1 weighing 168 lbs, Recent 7 lbs pound(s) weight gain with in 6 months while going through divorce attributes to tress eating. + F0 No fibrosis scored in FS reviewed.  # RHM: PCP Dr. ROBBIN HOYOS + MH/o: SLE since 33 yoa on Plaquenil 200mg BID only now. + COL- Dr. Richy CARLSON @ [facility], Performed 2023. (No reports to review) Denied any FH/O Liver disease or GI cancers.  + Immune to HAV/HBV, NR anti-HBc, HBsAg, HCV.  COVID IMM completed with 2 dose reports no exposures.  PLAN to F/U in a month with fasting BW and MRCP as ordered. Encouraged to call back in the interim with any issues or concerns so that we can address and assist as required.

## 2024-06-22 NOTE — HISTORY OF PRESENT ILLNESS
[FreeTextEntry1] : Ms. TACO LAINEZ is 53-year-old female who presents for the follow up with Liver enzymes elevations after stopping the OTC vitamins and Azathioprine.  PH/o MASLD since 2024 annual visit with PCP. She feels well. Denies c/o abdominal pain, pruritis, melena, GIB. Recent 7 lbs pound(s) weight gain with in 6 months while going through divorce attributes to stress eating. + Tatto and Body (Eye) piercing. WBC normalized after D/C Azathioprine (WNL Prometheus TPMT enzyme) and Vitamins after stopping all OTC supplements.  MH/o SLE since 33 yoa on Plaquenil 200mg BID, Azathioprine 50 mg TID and Prednisone 5 mg as needed for flares. Denies MH/O Liver diseases, clinical hepatitis, Jaundice.  Denies Social H/O alcohol use or smoking/marijuana use. Reports no high-risk occupation/behavior. She works as a teacher Assistance. 2 C-sections. Colonoscopy: Dr. Richy Means GI @ [facility], Performed 2023. (No reports to review) COL- GI Dr. Richy Means in [facility]. Denies any FH/O Liver disease or GI cancers. Fibroscan on 06/21/2024 shows F0 (E 5.0 kPa) and S3 ( dB/m)  Immune to HAV/HBV, NR anti-HBc, HBsAg, HCV. COVID IMM completed with 2 dose reports no exposures.  BW on 06/15/2024: Isolated ^ , MARCOS< 1:160, MCV/.6/36.7, Normalized WBC, Vit A/D, WDL- GGT, INR, PLT, H/H, Prometheus TPMT enzyme, low RBC 3.60. US ab on 05/25/2024: Mild hepatic steatosis, Common bile duct measures 7 mm (upper limit normal). Innumerable millimeter-sized calcified granulomas,   BW on 05/11/2024: WBC 2.97, RBC 3.54, .3, MCH 37, Vit A 78.7, Vit D 84.1 Speckled MARCOS 1:320, IgM 25, LKM <20.1, AMA/ASMA < 1:20, [id number] scored on THOMPSON Fibrosure+ A1c 5.5% WDL- Vit D1, 25 Dihydroxy, PLT, H/H, Zinc, AFP, CMP, GGT, DB, Bile acids, HAV/HBV Immune, NR HCV, anti-HBc, HBsAg, Noted: Co2 20. Reviewed HIE labs from 12/2023: Normal liver enzymes with low WBC, WDL- PLT and H/H.  Additional Providers: PCP Dr. ROBBIN HOYOS

## 2024-06-22 NOTE — PHYSICAL EXAM
[Scleral Icterus] : No Scleral Icterus [Depression] : no depression [General Appearance - Alert] : alert [General Appearance - In No Acute Distress] : in no acute distress [Sclera] : the sclera and conjunctiva were normal [Neck Appearance] : the appearance of the neck was normal [Neck Cervical Mass (___cm)] : no neck mass was observed [Apical Impulse] : the apical impulse was normal [Heart Sounds] : normal S1 and S2 [Edema] : there was no peripheral edema [Veins - Varicosity Changes] : there were no varicosital changes [Bowel Sounds] : normal bowel sounds [Abdomen Soft] : soft [Abdomen Mass (___ Cm)] : no abdominal mass palpated [Abdomen Hernia] : no hernia was discovered [Cervical Lymph Nodes Enlarged Posterior Bilaterally] : posterior cervical [Supraclavicular Lymph Nodes Enlarged Bilaterally] : supraclavicular [No CVA Tenderness] : no ~M costovertebral angle tenderness [Abnormal Walk] : normal gait [Nail Clubbing] : no clubbing  or cyanosis of the fingernails [Involuntary Movements] : no involuntary movements were seen [] : no rash [No Focal Deficits] : no focal deficits [Oriented To Time, Place, And Person] : oriented to person, place, and time

## 2024-06-24 ENCOUNTER — NON-APPOINTMENT (OUTPATIENT)
Age: 54
End: 2024-06-24

## 2024-06-24 LAB
TPMT ENZYME INTERPRETATION: NORMAL
TPMT ENZYME METHODOLOGY: NORMAL
TPMT ENZYME: 16.7

## 2025-09-19 ENCOUNTER — APPOINTMENT (OUTPATIENT)
Dept: ULTRASOUND IMAGING | Facility: CLINIC | Age: 55
End: 2025-09-19
Payer: COMMERCIAL

## 2025-09-19 PROCEDURE — 76700 US EXAM ABDOM COMPLETE: CPT
